# Patient Record
(demographics unavailable — no encounter records)

---

## 2024-10-29 NOTE — CARDIOLOGY SUMMARY
[de-identified] : 10/29/2024: NSR, incomplete RBBB [de-identified] : CCTA-2023 : mild to moderate LAD disease , CADSRADS 3 [de-identified] : 4/2024: , LDL 72, a1c 6.5%

## 2024-10-29 NOTE — CARDIOLOGY SUMMARY
[de-identified] : 10/29/2024: NSR, incomplete RBBB [de-identified] : CCTA-2023 : mild to moderate LAD disease , CADSRADS 3 [de-identified] : 4/2024: , LDL 72, a1c 6.5%

## 2024-10-29 NOTE — END OF VISIT
[] : Fellow [FreeTextEntry3] : I evaluated the patient. I discussed this case with the fellow and agree with the findings and plan as documented in the fellow's note.  I spent 33 minutes in contact with the patient and in non face time minutes in support of this visit includes the following:   Preparing to see the patient (review of tests/ outside records), Obtaining and/or reviewing separately obtained history, Counseling and educating the patient/family/caregiver, Ordering medications; tests; or procedures, Referring and communicating with other health care professionals (when not reported separately), Documenting clinical information in the medical record/chart, Independently interpreting results (not separately reported) and communicating results to the patient/family/caregiver, Care coordination (not separately reported).

## 2024-10-29 NOTE — HISTORY OF PRESENT ILLNESS
[FreeTextEntry1] : 47 yo female with hx of non obstructive CAD on CCTA , HTN, HLD, DM II presents to cardiology clinic for follow up.  Today denies chest pain, RG, no lower extremity swelling, syncope, dizziness. Reports occasional palpitations.    ID  [id number]  EKG-10/29/2024: NSR, incomplete RBBB

## 2024-10-29 NOTE — PHYSICAL EXAM
[Well Developed] : well developed [Normal S1, S2] : normal S1, S2 [Clear Lung Fields] : clear lung fields [Good Air Entry] : good air entry [No Edema] : no edema

## 2024-10-29 NOTE — HISTORY OF PRESENT ILLNESS
[FreeTextEntry1] : 49 yo female with hx of non obstructive CAD on CCTA , HTN, HLD, DM II presents to cardiology clinic for follow up.  Today denies chest pain, RG, no lower extremity swelling, syncope, dizziness. Reports occasional palpitations.    ID  [id number]  EKG-10/29/2024: NSR, incomplete RBBB

## 2024-10-29 NOTE — ASSESSMENT
[FreeTextEntry1] : 49 yo female with hx of non obstructive CAD on CCTA , HTN, HLD, DM II presents to cardiology clinic for follow up.  HTN-above goal  HLD  Non obstructive CAD, mild to mod LAD disease DM  Obesity   Plan-  -increase amlodipine to 10 mg daily  -cont Enalapril 20 mg daily,  -Continue aspirin 81 mg, atorvastatin 20 mg. LDL well controlled -f/u in 3 months

## 2024-11-26 NOTE — PHYSICAL EXAM
[Normocephalic] : normocephalic [EOMI] : extra ocular movement intact [No Supraclavicular Adenopathy] : no supraclavicular adenopathy [No Cervical Adenopathy] : no cervical adenopathy [Examined in the supine and seated position] : examined in the supine and seated position [No dominant masses] : no dominant masses in right breast  [No dominant masses] : no dominant masses left breast [No Nipple Retraction] : no left nipple retraction [No Nipple Discharge] : no left nipple discharge [No Axillary Lymphadenopathy] : no left axillary lymphadenopathy [No Rashes] : no rashes [No Ulceration] : no ulceration [de-identified] : NL respirations

## 2024-11-26 NOTE — HISTORY OF PRESENT ILLNESS
[FreeTextEntry1] : SANDRA REYESHERNANDEZ is a 49-year-old female patient who presents today for initial consultation for left breast pain.  fHx: Denies  Her imaging is as follows: B/L Dx Mammo & Left Breast Sono - 10/23/2024: -There are scattered areas of fibroglandular density. -No suspicious mass, microcalcifications or areas of architectural distortion seen in either breast including area of pain in medial left breast. ULTRASOUND: -There is no suspicious solid or cystic mass noted in medial left breast area of pain. IMPRESSION: No mammographic or sonographic evidence of malignancy. BI-RADS Category 1: Negative  She notes left breast pain that is on and off. No associated symptoms. Describes it as a soreness.

## 2024-11-26 NOTE — DATA REVIEWED
[FreeTextEntry1] : B/L Dx Mammo & Left Breast Sono - 10/23/2024: BREAST COMPOSITION: There are scattered areas of fibroglandular density.  FINDINGS:  MAMMOGRAM:  No suspicious mass, microcalcifications or areas of architectural distortion seen in either breast including area of pain in medial left breast.  ULTRASOUND:  There is no suspicious solid or cystic mass noted in medial left breast area of pain.  IMPRESSION:  No mammographic or sonographic evidence of malignancy.  Recommendation: clinical correlation is advised.  BI-RADS Category 1: Negative

## 2024-11-26 NOTE — ASSESSMENT
[FreeTextEntry1] : patient is a 49F with left breast pain. She notes left breast pain that is on and off. No associated symptoms. Describes it as a soreness.   She underwent bilateral mmg and left US in 10/2024 which showed no suspicious findings (BIRADS 1).  We discussed her most recent imaging was benign.  I discussed the nature of her breast pain. I explained to her that her breast tissue responds to the fluctuations in hormone levels which may cause pain. I advised the patient to consider Vit E 400IU twice daily and to eliminate caffeine from her diet. I explained to her that in some studies, patients reported relief with those measures. I also advised her to try using sports bra for more support.  We discussed breast density. Increasing breast density has been found to increase ones risk of breast cancer, but at this time, there is no clear indication for additional imaging in this setting, as both US and MRI have not been found to improve survival. One can consider bilateral screening US. However, out of 1000 women screened, the use of routine US will only identify an additional 3-5 cancers. The use of US was found to increase the likelihood of undergoing more imaging and more biopsies. She does not have dense breasts.   All questions and concerns were answered in detail.  Patient is for bilateral mmg in 10/2025. She is for follow up after imaging, pending any interval changes. She is to try conservative management for her breast pain and follow up sooner if she notes no improvement or any worsening of her pain.  Total time spent on encounter was greater than 45 minutes , which included face to face time with the patient, performing an exam, reviewing previous medical records, reviewing current imaging/ pathology, documenting in patient record and coordinating care/imaging. Greater than 50% of the encounter was spent on counseling and coordination of her breast issue.

## 2024-11-26 NOTE — PHYSICAL EXAM
[Normocephalic] : normocephalic [EOMI] : extra ocular movement intact [No Supraclavicular Adenopathy] : no supraclavicular adenopathy [No Cervical Adenopathy] : no cervical adenopathy [Examined in the supine and seated position] : examined in the supine and seated position [No dominant masses] : no dominant masses in right breast  [No dominant masses] : no dominant masses left breast [No Nipple Retraction] : no left nipple retraction [No Nipple Discharge] : no left nipple discharge [No Axillary Lymphadenopathy] : no left axillary lymphadenopathy [No Rashes] : no rashes [No Ulceration] : no ulceration [de-identified] : NL respirations

## 2024-12-13 NOTE — REVIEW OF SYSTEMS
[Fever] : no fever [Chills] : no chills [Night Sweats] : no night sweats [Chest Pain] : no chest pain [Palpitations] : no palpitations [Orthopnea] : no orthopnea [Shortness Of Breath] : no shortness of breath [Wheezing] : no wheezing [Cough] : no cough [Abdominal Pain] : no abdominal pain [Nausea] : no nausea [Constipation] : no constipation [Diarrhea] : diarrhea [Vomiting] : no vomiting [Dysuria] : no dysuria [Incontinence] : no incontinence [Hematuria] : no hematuria [Frequency] : no frequency [Joint Pain] : no joint pain [Muscle Pain] : no muscle pain [Back Pain] : no back pain [Skin Rash] : no skin rash [Headache] : no headache [Dizziness] : no dizziness

## 2024-12-13 NOTE — PHYSICAL EXAM
[No Acute Distress] : no acute distress [Well Nourished] : well nourished [Well Developed] : well developed [No Respiratory Distress] : no respiratory distress  [No Accessory Muscle Use] : no accessory muscle use [Clear to Auscultation] : lungs were clear to auscultation bilaterally [Normal Rate] : normal rate  [Regular Rhythm] : with a regular rhythm [Normal S1, S2] : normal S1 and S2 [Pedal Pulses Present] : the pedal pulses are present [No Extremity Clubbing/Cyanosis] : no extremity clubbing/cyanosis [Non Tender] : non-tender [No HSM] : no HSM [Normal Bowel Sounds] : normal bowel sounds [No Rash] : no rash [Alert and Oriented x3] : oriented to person, place, and time [de-identified] : +obese abdomen

## 2024-12-13 NOTE — HISTORY OF PRESENT ILLNESS
[FreeTextEntry1] : follow up [de-identified] : 48 yo F with PMHx of DM, HTN, DLD, NAFLD, Obesity, Vitamin D Insufficiency presents for follow up. Last clinic visit in 6/2024, patient endorsed foul smelling urine but reports resolution of sx. Today, patient reports dizziness so stopped taking her enalapril but otherwise denies any other complaints such as chest pain, sob, nausea vomiting, fever chills, diarrhea constipation. Patient notes a change in her medication-> cardiology increased the amlodipine to 10 mg once daily vs 2.5 mg once daily. Patient has followed up with Gyn, Cardiology and Breast on an outpatient basis and has followed up on their recommendations as indicated.

## 2024-12-13 NOTE — ASSESSMENT
[FreeTextEntry1] : 49 yo F with PMHx of DM, HTN, DLD, NAFLD, Obesity, Vitamin D Insufficiency presents for follow up  #Foul smelling urine- RESOLVED - no other associated symptoms - ua + for bacteria , but no wbc , patient is asymptomatic , no tx needed  #HTN - BP in the clinic today: 137/84  - on + amlodipine 10 mg QD - Patient does not take enalapril due to increased dizziness/ lightheadedness  #Intermittent L Breast pain- RESOLVED - Following with GYN and Breast clinic, last visit in 10/24 - B/L Dx Mammo & Left Breast Sono - 10/23/2024: There are scattered areas of fibroglandular density. No suspicious mass, microcalcifications or areas of architectural distortion seen in either breast including area of pain in medial left breast. - Breast ULTRASOUND 10/2024: There is no suspicious solid or cystic mass noted in medial left breast area of pain. IMPRESSION: No mammographic or sonographic evidence of malignancy. BI-RADS Category 1: Negative - Patient is for bilateral mmg in 10/2025.  #Nonobstructive CAD - CCTA mild to moderate LAD disease , CADSRADS 3 - Encourage lifestyle modification. - Cardiology visit on 10/2024 with Dr. Washburn: optimized BP control with + amlodipine 10 mg QD, c/w Enalapril 20 mg WD, c/w aspirin 81 mg , Atorvastatin 20 mg once daily (but patient is not taking the enalapril due to hypotensive episodes)  #DM Type II - A1c 6.5% (03/2024) - c/w metformin 500mg bid - follow with ophthalmology and podiatry- referral sent for today  #DLD (12/10/24)- , Chol 183, HDL 52, , Non HDL- 131 - c/w Atorvastatin 20 mg qhs - diet and lifestyle modifications advised  #Obesity - Counseled on maintaining active lifestyle and dietary changed - started on wegovy per hepatology but has not been able to get it because of the price - BMI = 36/5 - advised on importance of low fat high fiber diet , and exercise 150 mins a week.  #NAFLD - Hepatic steatosis on liver ultrasound - was started on wegovy 0.25 mg weekly by hepatology BUT HASNT TAKEN IT BECAUSE OF COST - CLD work up negative - Hepatology follow up, referral given this visit - mildly elevated alk phos 138 on last blood work > follow up repeat - ALP today 138 (130-135s) - will obtain RUQ and f/u next visit.  #vit D def/insuf - last vit D = 25 ng/mL - restart on vit D 2000 UT  #Chronic Endometritis- STABLE - s/p endometrial bx in 2019 - benign endometrium, chronic endometritis - Following with GYN  #HCM - Pap smear- will follow with GYN - Colonoscopy done Nov 08 2023 - colon polys; repeat 2028 - Mammogram done 10/2024- BIRADS 1- negative 9/2024  - repeat labs - RTC in 6 months. - RUQ US - Podiatry - Optho

## 2025-02-18 NOTE — PHYSICAL EXAM
[Well Developed] : well developed [Normal Venous Pressure] : normal venous pressure [Normal S1, S2] : normal S1, S2 [No Murmur] : no murmur [Clear Lung Fields] : clear lung fields [Good Air Entry] : good air entry [No Respiratory Distress] : no respiratory distress  [Soft] : abdomen soft [Non Tender] : non-tender [No Edema] : no edema

## 2025-02-18 NOTE — ASSESSMENT
[FreeTextEntry1] : 48 yo female with hx of non obstructive CAD on CCTA , HTN, HLD, DM II presents to cardiology clinic for follow up.  HTN-above goal HLD Non obstructive CAD, mild to mod LAD disease DM Obesity  Plan- -c/w amlodipine to 10 mg daily -stopped Enalapril 20 mg daily - the patient stopped it herself as taking both medications together was making her dizzy; if issues with BP in the future, her PCP can restart Enalapril at a lower dose and have her take them at different times of the day -Continue aspirin 81 mg - increased atorvastatin from 20 mg to 40 mg OD  -f/u in 6 months.

## 2025-02-18 NOTE — HISTORY OF PRESENT ILLNESS
[FreeTextEntry1] : 48 yo female with hx of non obstructive CAD on CCTA , HTN, HLD, DM II presents to cardiology clinic for follow up.  Today denies chest pain, RG, no lower extremity swelling, syncope, dizziness. Reports occasional palpitations. Patient had uncontrolled BP previously. She was on enalapril 20 mg and amlodipine 10 mg was added. When she took both she felt dizzy and tired and her BP dropped to 100 She stopped taking enalapril and is taking only amlodipine. BP at home ranges from 135/90 to 120/80

## 2025-06-13 NOTE — PHYSICAL EXAM
[No Acute Distress] : no acute distress [Well Nourished] : well nourished [Well Developed] : well developed [Normal Sclera/Conjunctiva] : normal sclera/conjunctiva [EOMI] : extraocular movements intact [Supple] : supple [Thyroid Normal, No Nodules] : the thyroid was normal and there were no nodules present [No Respiratory Distress] : no respiratory distress  [No Accessory Muscle Use] : no accessory muscle use [Clear to Auscultation] : lungs were clear to auscultation bilaterally [Normal Rate] : normal rate  [Regular Rhythm] : with a regular rhythm [Normal S1, S2] : normal S1 and S2 [Pedal Pulses Present] : the pedal pulses are present [No Extremity Clubbing/Cyanosis] : no extremity clubbing/cyanosis [Non Tender] : non-tender [Non-distended] : non-distended [No HSM] : no HSM [Normal Posterior Cervical Nodes] : no posterior cervical lymphadenopathy [Normal Anterior Cervical Nodes] : no anterior cervical lymphadenopathy [No CVA Tenderness] : no CVA  tenderness [Grossly Normal Strength/Tone] : grossly normal strength/tone [No Rash] : no rash [Coordination Grossly Intact] : coordination grossly intact [No Focal Deficits] : no focal deficits [Normal Affect] : the affect was normal [Alert and Oriented x3] : oriented to person, place, and time [Normal Insight/Judgement] : insight and judgment were intact

## 2025-06-13 NOTE — HISTORY OF PRESENT ILLNESS
[FreeTextEntry1] : f/up  [de-identified] : 48 yo F with PMHx of DM, HTN, DLD, NAFLD, Obesity, Vitamin D Insufficiency presents for follow up. Patient has no complaints today, has been compliant with her medications. She has not seen podiatrist or ophthalmologist recently. Also was not able to complete the RUQ US ordered from the last visit.

## 2025-06-13 NOTE — REVIEW OF SYSTEMS
[Fever] : no fever [Chills] : no chills [Night Sweats] : no night sweats [Vision Problems] : no vision problems [Earache] : no earache [Hearing Loss] : no hearing loss [Chest Pain] : no chest pain [Palpitations] : no palpitations [Orthopnea] : no orthopnea [Shortness Of Breath] : no shortness of breath [Wheezing] : no wheezing [Cough] : no cough [Abdominal Pain] : no abdominal pain [Nausea] : no nausea [Constipation] : no constipation [Diarrhea] : diarrhea [Vomiting] : no vomiting [Dysuria] : no dysuria [Incontinence] : no incontinence [Hematuria] : no hematuria [Frequency] : no frequency [Joint Pain] : no joint pain [Muscle Pain] : no muscle pain [Back Pain] : no back pain [Skin Rash] : no skin rash [Headache] : no headache [Dizziness] : no dizziness

## 2025-06-13 NOTE — ASSESSMENT
[FreeTextEntry1] : 50 yo F with PMHx of DM, HTN, DLD, NAFLD, Obesity, Vitamin D Insufficiency presents for follow up  #HTN - BP in the clinic today: 123/84  - continue with amlodipine 10 mg QD  #Nonobstructive CAD - CCTA mild to moderate LAD disease , CADSRADS 3 - Encourage lifestyle modification. - Cardiology visit on 10/2024 with Dr. Washburn: optimized BP control with + amlodipine 10 mg QD, - c/w aspirin 81 mg , Atorvastatin 20 mg once daily  #DM Type II - A1c 6.5% (06/2025) - c/w metformin 500mg bid - continue with ophthalmology and podiatry- referral sent for today  #DLD - 06/2025: 55 - c/w Atorvastatin 20 mg qhs - diet and lifestyle modifications advised  #NAFLD - Hepatic steatosis on liver ultrasound - was started on wegovy 0.25 mg weekly by hepatology BUT HASNT TAKEN IT BECAUSE OF COST - CLD work up negative - Hepatology follow up, referral given this visit - ALP today 156 (130-135s) - will obtain RUQ and f/u next visit  #HCM - Pap smear- will follow with GYN - Colonoscopy done Nov 08 2023 - colon polys; repeat 2028 - Mammogram done 10/2024- BIRADS 1- negative 9/2024 - repeat labs - RTC in 6 months. - RUQ US - mammogram scheduled - gyn appt scheduled - repeat colonscopy in 5 years 2029